# Patient Record
Sex: FEMALE | Race: WHITE | NOT HISPANIC OR LATINO
[De-identification: names, ages, dates, MRNs, and addresses within clinical notes are randomized per-mention and may not be internally consistent; named-entity substitution may affect disease eponyms.]

---

## 2017-06-09 ENCOUNTER — TRANSCRIPTION ENCOUNTER (OUTPATIENT)
Age: 52
End: 2017-06-09

## 2018-07-12 ENCOUNTER — OUTPATIENT (OUTPATIENT)
Dept: OUTPATIENT SERVICES | Facility: HOSPITAL | Age: 53
LOS: 1 days | End: 2018-07-12
Payer: COMMERCIAL

## 2018-07-12 VITALS
TEMPERATURE: 98 F | OXYGEN SATURATION: 98 % | DIASTOLIC BLOOD PRESSURE: 77 MMHG | SYSTOLIC BLOOD PRESSURE: 136 MMHG | WEIGHT: 126.99 LBS | HEIGHT: 61 IN | HEART RATE: 76 BPM | RESPIRATION RATE: 16 BRPM

## 2018-07-12 DIAGNOSIS — Z01.818 ENCOUNTER FOR OTHER PREPROCEDURAL EXAMINATION: ICD-10-CM

## 2018-07-12 DIAGNOSIS — N92.4 EXCESSIVE BLEEDING IN THE PREMENOPAUSAL PERIOD: ICD-10-CM

## 2018-07-12 DIAGNOSIS — Z98.51 TUBAL LIGATION STATUS: Chronic | ICD-10-CM

## 2018-07-12 DIAGNOSIS — Z98.890 OTHER SPECIFIED POSTPROCEDURAL STATES: Chronic | ICD-10-CM

## 2018-07-12 DIAGNOSIS — Z98.891 HISTORY OF UTERINE SCAR FROM PREVIOUS SURGERY: Chronic | ICD-10-CM

## 2018-07-12 DIAGNOSIS — I10 ESSENTIAL (PRIMARY) HYPERTENSION: ICD-10-CM

## 2018-07-12 LAB
ANION GAP SERPL CALC-SCNC: 7 MMOL/L — SIGNIFICANT CHANGE UP (ref 5–17)
BUN SERPL-MCNC: 11 MG/DL — SIGNIFICANT CHANGE UP (ref 7–23)
CALCIUM SERPL-MCNC: 8.7 MG/DL — SIGNIFICANT CHANGE UP (ref 8.5–10.1)
CHLORIDE SERPL-SCNC: 108 MMOL/L — SIGNIFICANT CHANGE UP (ref 96–108)
CO2 SERPL-SCNC: 27 MMOL/L — SIGNIFICANT CHANGE UP (ref 22–31)
CREAT SERPL-MCNC: 0.77 MG/DL — SIGNIFICANT CHANGE UP (ref 0.5–1.3)
GLUCOSE SERPL-MCNC: 83 MG/DL — SIGNIFICANT CHANGE UP (ref 70–99)
HCG UR QL: NEGATIVE — SIGNIFICANT CHANGE UP
HCT VFR BLD CALC: 36.2 % — SIGNIFICANT CHANGE UP (ref 34.5–45)
HGB BLD-MCNC: 12.5 G/DL — SIGNIFICANT CHANGE UP (ref 11.5–15.5)
MCHC RBC-ENTMCNC: 31.6 PG — SIGNIFICANT CHANGE UP (ref 27–34)
MCHC RBC-ENTMCNC: 34.5 GM/DL — SIGNIFICANT CHANGE UP (ref 32–36)
MCV RBC AUTO: 91.6 FL — SIGNIFICANT CHANGE UP (ref 80–100)
NRBC # BLD: 0 /100 WBCS — SIGNIFICANT CHANGE UP (ref 0–0)
PLATELET # BLD AUTO: 259 K/UL — SIGNIFICANT CHANGE UP (ref 150–400)
POTASSIUM SERPL-MCNC: 4 MMOL/L — SIGNIFICANT CHANGE UP (ref 3.5–5.3)
POTASSIUM SERPL-SCNC: 4 MMOL/L — SIGNIFICANT CHANGE UP (ref 3.5–5.3)
RBC # BLD: 3.95 M/UL — SIGNIFICANT CHANGE UP (ref 3.8–5.2)
RBC # FLD: 12.2 % — SIGNIFICANT CHANGE UP (ref 10.3–14.5)
SODIUM SERPL-SCNC: 142 MMOL/L — SIGNIFICANT CHANGE UP (ref 135–145)
WBC # BLD: 7.53 K/UL — SIGNIFICANT CHANGE UP (ref 3.8–10.5)
WBC # FLD AUTO: 7.53 K/UL — SIGNIFICANT CHANGE UP (ref 3.8–10.5)

## 2018-07-12 PROCEDURE — 93005 ELECTROCARDIOGRAM TRACING: CPT

## 2018-07-12 PROCEDURE — 80048 BASIC METABOLIC PNL TOTAL CA: CPT

## 2018-07-12 PROCEDURE — 86850 RBC ANTIBODY SCREEN: CPT

## 2018-07-12 PROCEDURE — 85027 COMPLETE CBC AUTOMATED: CPT

## 2018-07-12 PROCEDURE — 81025 URINE PREGNANCY TEST: CPT

## 2018-07-12 PROCEDURE — G0463: CPT

## 2018-07-12 PROCEDURE — 86900 BLOOD TYPING SEROLOGIC ABO: CPT

## 2018-07-12 PROCEDURE — 93010 ELECTROCARDIOGRAM REPORT: CPT | Mod: NC

## 2018-07-12 PROCEDURE — 86901 BLOOD TYPING SEROLOGIC RH(D): CPT

## 2018-07-12 NOTE — H&P PST ADULT - HISTORY OF PRESENT ILLNESS
53 yo female 51 yo female with h/o HTN, hypothyroidism, anxiety 53 yo female with h/o HTN, hypothyroidism, anxiety c/o irregular periods x 3 months. Pt had GYN evaluation- s/p pelvic sonogram - scheduled for dilatation and curettage hysteroscopy  on 07/19/2018.

## 2018-07-12 NOTE — H&P PST ADULT - PMH
Anxiety    Essential hypertension    Excessive bleeding in premenopausal period    Hypothyroidism, adult

## 2018-07-13 NOTE — H&P PST ADULT - HISTORY OF PRESENT ILLNESS
51 yo WF, , LMP-5/28/18, 6/14/18, c/o recent persistent heavy vaginal bleeding that started 6/14/18 and has persisted since. The bleeding has been very heavy for 2 wks and even "poured out " upon standing.

## 2018-07-18 ENCOUNTER — TRANSCRIPTION ENCOUNTER (OUTPATIENT)
Age: 53
End: 2018-07-18

## 2018-07-18 RX ORDER — ACETAMINOPHEN 500 MG
650 TABLET ORAL ONCE
Qty: 0 | Refills: 0 | Status: COMPLETED | OUTPATIENT
Start: 2018-07-19 | End: 2018-07-19

## 2018-07-18 RX ORDER — SODIUM CHLORIDE 9 MG/ML
1000 INJECTION, SOLUTION INTRAVENOUS
Qty: 0 | Refills: 0 | Status: DISCONTINUED | OUTPATIENT
Start: 2018-07-19 | End: 2018-07-19

## 2018-07-19 ENCOUNTER — OUTPATIENT (OUTPATIENT)
Dept: OUTPATIENT SERVICES | Facility: HOSPITAL | Age: 53
LOS: 1 days | End: 2018-07-19
Payer: COMMERCIAL

## 2018-07-19 VITALS
WEIGHT: 126.99 LBS | HEIGHT: 61 IN | SYSTOLIC BLOOD PRESSURE: 135 MMHG | DIASTOLIC BLOOD PRESSURE: 64 MMHG | TEMPERATURE: 98 F | HEART RATE: 69 BPM | OXYGEN SATURATION: 100 % | RESPIRATION RATE: 16 BRPM

## 2018-07-19 VITALS
SYSTOLIC BLOOD PRESSURE: 111 MMHG | RESPIRATION RATE: 16 BRPM | OXYGEN SATURATION: 95 % | TEMPERATURE: 98 F | DIASTOLIC BLOOD PRESSURE: 69 MMHG | HEART RATE: 75 BPM

## 2018-07-19 DIAGNOSIS — Z98.890 OTHER SPECIFIED POSTPROCEDURAL STATES: Chronic | ICD-10-CM

## 2018-07-19 DIAGNOSIS — Z01.818 ENCOUNTER FOR OTHER PREPROCEDURAL EXAMINATION: ICD-10-CM

## 2018-07-19 DIAGNOSIS — Z98.891 HISTORY OF UTERINE SCAR FROM PREVIOUS SURGERY: Chronic | ICD-10-CM

## 2018-07-19 DIAGNOSIS — Z98.51 TUBAL LIGATION STATUS: Chronic | ICD-10-CM

## 2018-07-19 DIAGNOSIS — N92.4 EXCESSIVE BLEEDING IN THE PREMENOPAUSAL PERIOD: ICD-10-CM

## 2018-07-19 PROCEDURE — 88305 TISSUE EXAM BY PATHOLOGIST: CPT

## 2018-07-19 PROCEDURE — 88305 TISSUE EXAM BY PATHOLOGIST: CPT | Mod: 26

## 2018-07-19 PROCEDURE — 58558 HYSTEROSCOPY BIOPSY: CPT

## 2018-07-19 RX ORDER — HYDROMORPHONE HYDROCHLORIDE 2 MG/ML
0.5 INJECTION INTRAMUSCULAR; INTRAVENOUS; SUBCUTANEOUS
Qty: 0 | Refills: 0 | Status: DISCONTINUED | OUTPATIENT
Start: 2018-07-19 | End: 2018-07-19

## 2018-07-19 RX ORDER — CITALOPRAM 10 MG/1
1 TABLET, FILM COATED ORAL
Qty: 0 | Refills: 0 | COMMUNITY

## 2018-07-19 RX ORDER — LOSARTAN POTASSIUM 100 MG/1
1 TABLET, FILM COATED ORAL
Qty: 0 | Refills: 0 | COMMUNITY

## 2018-07-19 RX ORDER — OXYCODONE HYDROCHLORIDE 5 MG/1
5 TABLET ORAL ONCE
Qty: 0 | Refills: 0 | Status: DISCONTINUED | OUTPATIENT
Start: 2018-07-19 | End: 2018-07-19

## 2018-07-19 RX ORDER — LEVOTHYROXINE SODIUM 125 MCG
1 TABLET ORAL
Qty: 0 | Refills: 0 | COMMUNITY

## 2018-07-19 RX ORDER — SODIUM CHLORIDE 9 MG/ML
1000 INJECTION, SOLUTION INTRAVENOUS
Qty: 0 | Refills: 0 | Status: DISCONTINUED | OUTPATIENT
Start: 2018-07-19 | End: 2018-07-19

## 2018-07-19 RX ORDER — METOCLOPRAMIDE HCL 10 MG
5 TABLET ORAL ONCE
Qty: 0 | Refills: 0 | Status: DISCONTINUED | OUTPATIENT
Start: 2018-07-19 | End: 2018-07-19

## 2018-07-19 RX ADMIN — Medication 650 MILLIGRAM(S): at 08:32

## 2018-07-19 RX ADMIN — SODIUM CHLORIDE 100 MILLILITER(S): 9 INJECTION, SOLUTION INTRAVENOUS at 09:44

## 2018-07-19 RX ADMIN — SODIUM CHLORIDE 60 MILLILITER(S): 9 INJECTION, SOLUTION INTRAVENOUS at 08:32

## 2018-07-19 NOTE — ASU DISCHARGE PLAN (ADULT/PEDIATRIC). - MEDICATION SUMMARY - MEDICATIONS TO TAKE
I will START or STAY ON the medications listed below when I get home from the hospital:    one a day vitamin  -- 1   once a day  -- Indication: For vit.    losartan 25 mg oral tablet  -- 1 tab(s) by mouth once a day  -- Indication: For HTN    citalopram 20 mg oral tablet  -- 1 tab(s) by mouth once a day  -- Indication: For depression    levothyroxine 125 mcg (0.125 mg) oral tablet  -- 1 tab(s) by mouth once a day  -- Indication: For hypothyroid

## 2018-07-19 NOTE — BRIEF OPERATIVE NOTE - PROCEDURE
<<-----Click on this checkbox to enter Procedure Hysteroscopy with dilation and curettage of uterus  07/19/2018    Active  LETICIA

## 2018-07-19 NOTE — ASU DISCHARGE PLAN (ADULT/PEDIATRIC). - NOTIFY
GYN Fever>100.4/Inability to Tolerate Liquids or Foods/Bleeding that does not stop/Pain not relieved by Medications

## 2018-07-20 LAB — SURGICAL PATHOLOGY FINAL REPORT - CH: SIGNIFICANT CHANGE UP

## 2020-02-26 NOTE — H&P PST ADULT - BLOOD TRANSFUSION, PREVIOUS, PROFILE
NURSE NOTES:

Received report from MILLICENT Lin. Patient is in bed, awake, alert, and responsive. Breathing 
regular and unlabored with no S/S of SOB noted at this time. Patient denies any pain or 
discomfort at this time. Patient is able to ambulate without assist to and from bathroom. IV 
access on the LFA, patent, intact, and running fluids at prescribed rate. Bed remains in 
lowest position, breaks engaged, and call light is within reach at all times. All other 
needs attended to, patient remains stable, will continue to monitor. no

## 2020-03-18 NOTE — H&P PST ADULT - GUM GEN PE MLT EXAM PC
Normal genitalia; no lesions; no discharge
I have personally seen and examined this patient.  I have fully participated in the care of this patient. I have reviewed all pertinent clinical information, including history, physical exam, plan and the Resident’s note and agree except as noted.

## 2021-05-17 ENCOUNTER — TRANSCRIPTION ENCOUNTER (OUTPATIENT)
Age: 56
End: 2021-05-17

## 2021-05-31 ENCOUNTER — TRANSCRIPTION ENCOUNTER (OUTPATIENT)
Age: 56
End: 2021-05-31

## 2021-08-16 ENCOUNTER — TRANSCRIPTION ENCOUNTER (OUTPATIENT)
Age: 56
End: 2021-08-16

## 2022-01-27 ENCOUNTER — EMERGENCY (EMERGENCY)
Facility: HOSPITAL | Age: 57
LOS: 1 days | Discharge: HOME | End: 2022-01-27
Attending: EMERGENCY MEDICINE | Admitting: EMERGENCY MEDICINE
Payer: COMMERCIAL

## 2022-01-27 VITALS
HEART RATE: 87 BPM | RESPIRATION RATE: 16 BRPM | DIASTOLIC BLOOD PRESSURE: 81 MMHG | SYSTOLIC BLOOD PRESSURE: 133 MMHG | TEMPERATURE: 99 F | OXYGEN SATURATION: 97 %

## 2022-01-27 VITALS
TEMPERATURE: 98 F | RESPIRATION RATE: 16 BRPM | SYSTOLIC BLOOD PRESSURE: 132 MMHG | HEART RATE: 85 BPM | OXYGEN SATURATION: 97 % | DIASTOLIC BLOOD PRESSURE: 83 MMHG | WEIGHT: 136.91 LBS | HEIGHT: 61 IN

## 2022-01-27 DIAGNOSIS — Z98.890 OTHER SPECIFIED POSTPROCEDURAL STATES: Chronic | ICD-10-CM

## 2022-01-27 DIAGNOSIS — Z98.891 HISTORY OF UTERINE SCAR FROM PREVIOUS SURGERY: Chronic | ICD-10-CM

## 2022-01-27 DIAGNOSIS — Z98.51 TUBAL LIGATION STATUS: Chronic | ICD-10-CM

## 2022-01-27 PROBLEM — E03.9 HYPOTHYROIDISM, UNSPECIFIED: Chronic | Status: ACTIVE | Noted: 2018-07-12

## 2022-01-27 PROBLEM — N92.4 EXCESSIVE BLEEDING IN THE PREMENOPAUSAL PERIOD: Chronic | Status: ACTIVE | Noted: 2018-07-12

## 2022-01-27 PROBLEM — F41.9 ANXIETY DISORDER, UNSPECIFIED: Chronic | Status: ACTIVE | Noted: 2018-07-12

## 2022-01-27 PROBLEM — I10 ESSENTIAL (PRIMARY) HYPERTENSION: Chronic | Status: ACTIVE | Noted: 2018-07-12

## 2022-01-27 LAB
ALBUMIN SERPL ELPH-MCNC: 3.7 G/DL — SIGNIFICANT CHANGE UP (ref 3.3–5)
ALP SERPL-CCNC: 99 U/L — SIGNIFICANT CHANGE UP (ref 40–120)
ALT FLD-CCNC: 36 U/L — SIGNIFICANT CHANGE UP (ref 12–78)
ANION GAP SERPL CALC-SCNC: 7 MMOL/L — SIGNIFICANT CHANGE UP (ref 5–17)
APPEARANCE UR: CLEAR — SIGNIFICANT CHANGE UP
APTT BLD: 32.8 SEC — SIGNIFICANT CHANGE UP (ref 27.5–35.5)
AST SERPL-CCNC: 22 U/L — SIGNIFICANT CHANGE UP (ref 15–37)
BASOPHILS # BLD AUTO: 0.06 K/UL — SIGNIFICANT CHANGE UP (ref 0–0.2)
BASOPHILS NFR BLD AUTO: 0.5 % — SIGNIFICANT CHANGE UP (ref 0–2)
BILIRUB SERPL-MCNC: 0.7 MG/DL — SIGNIFICANT CHANGE UP (ref 0.2–1.2)
BILIRUB UR-MCNC: NEGATIVE — SIGNIFICANT CHANGE UP
BUN SERPL-MCNC: 10 MG/DL — SIGNIFICANT CHANGE UP (ref 7–23)
CALCIUM SERPL-MCNC: 8.6 MG/DL — SIGNIFICANT CHANGE UP (ref 8.5–10.1)
CHLORIDE SERPL-SCNC: 112 MMOL/L — HIGH (ref 96–108)
CO2 SERPL-SCNC: 24 MMOL/L — SIGNIFICANT CHANGE UP (ref 22–31)
COLOR SPEC: YELLOW — SIGNIFICANT CHANGE UP
CREAT SERPL-MCNC: 0.77 MG/DL — SIGNIFICANT CHANGE UP (ref 0.5–1.3)
DIFF PNL FLD: NEGATIVE — SIGNIFICANT CHANGE UP
EOSINOPHIL # BLD AUTO: 0.2 K/UL — SIGNIFICANT CHANGE UP (ref 0–0.5)
EOSINOPHIL NFR BLD AUTO: 1.7 % — SIGNIFICANT CHANGE UP (ref 0–6)
FLUAV AG NPH QL: SIGNIFICANT CHANGE UP
FLUBV AG NPH QL: SIGNIFICANT CHANGE UP
GLUCOSE SERPL-MCNC: 118 MG/DL — HIGH (ref 70–99)
GLUCOSE UR QL: NEGATIVE — SIGNIFICANT CHANGE UP
HCT VFR BLD CALC: 40.4 % — SIGNIFICANT CHANGE UP (ref 34.5–45)
HGB BLD-MCNC: 13.7 G/DL — SIGNIFICANT CHANGE UP (ref 11.5–15.5)
IMM GRANULOCYTES NFR BLD AUTO: 0.3 % — SIGNIFICANT CHANGE UP (ref 0–1.5)
INR BLD: 1.2 RATIO — HIGH (ref 0.88–1.16)
KETONES UR-MCNC: NEGATIVE — SIGNIFICANT CHANGE UP
LACTATE SERPL-SCNC: 0.8 MMOL/L — SIGNIFICANT CHANGE UP (ref 0.7–2)
LEUKOCYTE ESTERASE UR-ACNC: NEGATIVE — SIGNIFICANT CHANGE UP
LYMPHOCYTES # BLD AUTO: 0.91 K/UL — LOW (ref 1–3.3)
LYMPHOCYTES # BLD AUTO: 7.9 % — LOW (ref 13–44)
MCHC RBC-ENTMCNC: 30.6 PG — SIGNIFICANT CHANGE UP (ref 27–34)
MCHC RBC-ENTMCNC: 33.9 GM/DL — SIGNIFICANT CHANGE UP (ref 32–36)
MCV RBC AUTO: 90.4 FL — SIGNIFICANT CHANGE UP (ref 80–100)
MONOCYTES # BLD AUTO: 0.74 K/UL — SIGNIFICANT CHANGE UP (ref 0–0.9)
MONOCYTES NFR BLD AUTO: 6.5 % — SIGNIFICANT CHANGE UP (ref 2–14)
NEUTROPHILS # BLD AUTO: 9.51 K/UL — HIGH (ref 1.8–7.4)
NEUTROPHILS NFR BLD AUTO: 83.1 % — HIGH (ref 43–77)
NITRITE UR-MCNC: NEGATIVE — SIGNIFICANT CHANGE UP
NRBC # BLD: 0 /100 WBCS — SIGNIFICANT CHANGE UP (ref 0–0)
PH UR: 6.5 — SIGNIFICANT CHANGE UP (ref 5–8)
PLATELET # BLD AUTO: 215 K/UL — SIGNIFICANT CHANGE UP (ref 150–400)
POTASSIUM SERPL-MCNC: 3.7 MMOL/L — SIGNIFICANT CHANGE UP (ref 3.5–5.3)
POTASSIUM SERPL-SCNC: 3.7 MMOL/L — SIGNIFICANT CHANGE UP (ref 3.5–5.3)
PROT SERPL-MCNC: 7.4 G/DL — SIGNIFICANT CHANGE UP (ref 6–8.3)
PROT UR-MCNC: 15
PROTHROM AB SERPL-ACNC: 13.9 SEC — HIGH (ref 10.6–13.6)
RBC # BLD: 4.47 M/UL — SIGNIFICANT CHANGE UP (ref 3.8–5.2)
RBC # FLD: 11.9 % — SIGNIFICANT CHANGE UP (ref 10.3–14.5)
RSV RNA NPH QL NAA+NON-PROBE: SIGNIFICANT CHANGE UP
SARS-COV-2 RNA SPEC QL NAA+PROBE: SIGNIFICANT CHANGE UP
SODIUM SERPL-SCNC: 143 MMOL/L — SIGNIFICANT CHANGE UP (ref 135–145)
SP GR SPEC: 1.01 — SIGNIFICANT CHANGE UP (ref 1.01–1.02)
UROBILINOGEN FLD QL: NEGATIVE — SIGNIFICANT CHANGE UP
WBC # BLD: 11.46 K/UL — HIGH (ref 3.8–10.5)
WBC # FLD AUTO: 11.46 K/UL — HIGH (ref 3.8–10.5)

## 2022-01-27 PROCEDURE — 83605 ASSAY OF LACTIC ACID: CPT

## 2022-01-27 PROCEDURE — 70491 CT SOFT TISSUE NECK W/DYE: CPT | Mod: MA

## 2022-01-27 PROCEDURE — 99285 EMERGENCY DEPT VISIT HI MDM: CPT | Mod: 25

## 2022-01-27 PROCEDURE — 36415 COLL VENOUS BLD VENIPUNCTURE: CPT

## 2022-01-27 PROCEDURE — 84145 PROCALCITONIN (PCT): CPT

## 2022-01-27 PROCEDURE — 96374 THER/PROPH/DIAG INJ IV PUSH: CPT | Mod: XU

## 2022-01-27 PROCEDURE — 93005 ELECTROCARDIOGRAM TRACING: CPT

## 2022-01-27 PROCEDURE — 70491 CT SOFT TISSUE NECK W/DYE: CPT | Mod: 26,MA

## 2022-01-27 PROCEDURE — 93010 ELECTROCARDIOGRAM REPORT: CPT

## 2022-01-27 PROCEDURE — 80053 COMPREHEN METABOLIC PANEL: CPT

## 2022-01-27 PROCEDURE — 84702 CHORIONIC GONADOTROPIN TEST: CPT

## 2022-01-27 PROCEDURE — 85025 COMPLETE CBC W/AUTO DIFF WBC: CPT

## 2022-01-27 PROCEDURE — 87637 SARSCOV2&INF A&B&RSV AMP PRB: CPT

## 2022-01-27 PROCEDURE — 87040 BLOOD CULTURE FOR BACTERIA: CPT

## 2022-01-27 PROCEDURE — 71045 X-RAY EXAM CHEST 1 VIEW: CPT

## 2022-01-27 PROCEDURE — 85730 THROMBOPLASTIN TIME PARTIAL: CPT

## 2022-01-27 PROCEDURE — 87086 URINE CULTURE/COLONY COUNT: CPT

## 2022-01-27 PROCEDURE — 71045 X-RAY EXAM CHEST 1 VIEW: CPT | Mod: 26

## 2022-01-27 PROCEDURE — 99285 EMERGENCY DEPT VISIT HI MDM: CPT

## 2022-01-27 PROCEDURE — 81001 URINALYSIS AUTO W/SCOPE: CPT

## 2022-01-27 PROCEDURE — 85610 PROTHROMBIN TIME: CPT

## 2022-01-27 RX ORDER — SODIUM CHLORIDE 9 MG/ML
1950 INJECTION INTRAMUSCULAR; INTRAVENOUS; SUBCUTANEOUS ONCE
Refills: 0 | Status: COMPLETED | OUTPATIENT
Start: 2022-01-27 | End: 2022-01-27

## 2022-01-27 RX ORDER — KETOROLAC TROMETHAMINE 30 MG/ML
15 SYRINGE (ML) INJECTION ONCE
Refills: 0 | Status: DISCONTINUED | OUTPATIENT
Start: 2022-01-27 | End: 2022-01-27

## 2022-01-27 RX ADMIN — SODIUM CHLORIDE 1950 MILLILITER(S): 9 INJECTION INTRAMUSCULAR; INTRAVENOUS; SUBCUTANEOUS at 11:00

## 2022-01-27 RX ADMIN — Medication 15 MILLIGRAM(S): at 12:05

## 2022-01-27 RX ADMIN — Medication 15 MILLIGRAM(S): at 11:51

## 2022-01-27 RX ADMIN — SODIUM CHLORIDE 1950 MILLILITER(S): 9 INJECTION INTRAMUSCULAR; INTRAVENOUS; SUBCUTANEOUS at 10:05

## 2022-01-27 NOTE — ED ADULT NURSE REASSESSMENT NOTE - NS ED NURSE REASSESS COMMENT FT1
1247- Pt resting comfortably in stretcher, breathing equal and unlabored. Skin warm, dry and good color. Pt reports improvement in pain. Safety measures maintained, Call bell within reach. Will continue to monitor.

## 2022-01-27 NOTE — ED PROVIDER NOTE - CLINICAL SUMMARY MEDICAL DECISION MAKING FREE TEXT BOX
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

## 2022-01-27 NOTE — ED PROVIDER NOTE - NSICDXPASTMEDICALHX_GEN_ALL_CORE_FT
PAST MEDICAL HISTORY:  Anxiety     Essential hypertension     Excessive bleeding in premenopausal period     Hypothyroidism, adult

## 2022-01-27 NOTE — ED PROVIDER NOTE - NSICDXPASTSURGICALHX_GEN_ALL_CORE_FT
PAST SURGICAL HISTORY:  History of bilateral tubal ligation 10/1997    History of  x 3    History of D&C 2007

## 2022-01-27 NOTE — ED ADULT NURSE NOTE - OBJECTIVE STATEMENT
Pt AOx4 breathing equal and unlabored has had neck pain since Tuesday. Pt unable to move her neck to the right. Pt denies injury or trauma. Pt states she began to have a fever last night. Pt denies fever today. pt denies nausea, vomiting, diarrhea. Safety measures initiated. Will continue to monitor.

## 2022-01-27 NOTE — ED PROVIDER NOTE - OBJECTIVE STATEMENT
pt reports R sided neck pain since Tuesday night that is worse with movement of the neck, no headache, pain is also worse with swallowing but no problems opening her mouth and talking.  Yesterday pt developed chills and fever of 100.6 at home.  tried tylenol and heat pack to neck without relief.

## 2022-01-27 NOTE — ED PROVIDER NOTE - NSFOLLOWUPINSTRUCTIONS_ED_ALL_ED_FT
-- Follow up with spine referral within the next few days.    -- Take motrin 400 mg every 6 hours with food for the next 5 days.    -- Return to the ER for worsening or persistent symptoms, and/or ANY NEW OR CONCERNING SYMPTOMS.    -- If you have difficulty following up, please call: [phone] to obtain a [facility] doctor or specialist who takes your insurance in your area. -- YOU HAVE CALCIFIC TENDONITIS OF THE RIGHT LONGUS COLI MUSCE OF THE NECK    -- Follow up with spine referral within the next few days.    -- Take motrin 400 mg every 6 hours with food for the next 5 days.    -- Return to the ER for worsening or persistent symptoms, and/or ANY NEW OR CONCERNING SYMPTOMS.    -- If you have difficulty following up, please call: [phone] to obtain a [facility] doctor or specialist who takes your insurance in your area.

## 2022-01-27 NOTE — ED PROVIDER NOTE - PATIENT PORTAL LINK FT
You can access the FollowMyHealth Patient Portal offered by [facility] by registering at the following website: [email or web address]. By joining [employer]’s FollowMyHealth portal, you will also be able to view your health information using other applications (apps) compatible with our system.

## 2022-01-27 NOTE — ED PROVIDER NOTE - CARE PROVIDER_API CALL
Bouchra Whitney (DO)  Orthopaedic Surgery Surgery  [address] [facility], [address] [place] [address]  Phone: [phone]  Fax: [phone]  Follow Up Time:

## 2022-01-28 ENCOUNTER — EMERGENCY (EMERGENCY)
Facility: HOSPITAL | Age: 57
LOS: 1 days | Discharge: HOME | End: 2022-01-28
Attending: EMERGENCY MEDICINE | Admitting: EMERGENCY MEDICINE
Payer: COMMERCIAL

## 2022-01-28 VITALS
HEART RATE: 92 BPM | OXYGEN SATURATION: 95 % | TEMPERATURE: 100 F | DIASTOLIC BLOOD PRESSURE: 77 MMHG | HEIGHT: 61 IN | WEIGHT: 136.03 LBS | SYSTOLIC BLOOD PRESSURE: 173 MMHG | RESPIRATION RATE: 18 BRPM

## 2022-01-28 VITALS
SYSTOLIC BLOOD PRESSURE: 139 MMHG | DIASTOLIC BLOOD PRESSURE: 76 MMHG | HEART RATE: 71 BPM | OXYGEN SATURATION: 96 % | TEMPERATURE: 99 F | RESPIRATION RATE: 18 BRPM

## 2022-01-28 DIAGNOSIS — Z98.51 TUBAL LIGATION STATUS: Chronic | ICD-10-CM

## 2022-01-28 DIAGNOSIS — Z98.890 OTHER SPECIFIED POSTPROCEDURAL STATES: Chronic | ICD-10-CM

## 2022-01-28 DIAGNOSIS — Z98.891 HISTORY OF UTERINE SCAR FROM PREVIOUS SURGERY: Chronic | ICD-10-CM

## 2022-01-28 LAB
ANION GAP SERPL CALC-SCNC: 5 MMOL/L — SIGNIFICANT CHANGE UP (ref 5–17)
APPEARANCE UR: CLEAR — SIGNIFICANT CHANGE UP
BACTERIA # UR AUTO: ABNORMAL
BASOPHILS # BLD AUTO: 0.04 K/UL — SIGNIFICANT CHANGE UP (ref 0–0.2)
BASOPHILS NFR BLD AUTO: 0.3 % — SIGNIFICANT CHANGE UP (ref 0–2)
BILIRUB UR-MCNC: NEGATIVE — SIGNIFICANT CHANGE UP
BUN SERPL-MCNC: 10 MG/DL — SIGNIFICANT CHANGE UP (ref 7–23)
CALCIUM SERPL-MCNC: 9.6 MG/DL — SIGNIFICANT CHANGE UP (ref 8.5–10.1)
CHLORIDE SERPL-SCNC: 111 MMOL/L — HIGH (ref 96–108)
CO2 SERPL-SCNC: 24 MMOL/L — SIGNIFICANT CHANGE UP (ref 22–31)
COLOR SPEC: YELLOW — SIGNIFICANT CHANGE UP
CREAT SERPL-MCNC: 0.75 MG/DL — SIGNIFICANT CHANGE UP (ref 0.5–1.3)
CULTURE RESULTS: SIGNIFICANT CHANGE UP
DIFF PNL FLD: ABNORMAL
EOSINOPHIL # BLD AUTO: 0.03 K/UL — SIGNIFICANT CHANGE UP (ref 0–0.5)
EOSINOPHIL NFR BLD AUTO: 0.2 % — SIGNIFICANT CHANGE UP (ref 0–6)
EPI CELLS # UR: SIGNIFICANT CHANGE UP
GLUCOSE SERPL-MCNC: 114 MG/DL — HIGH (ref 70–99)
GLUCOSE UR QL: NEGATIVE — SIGNIFICANT CHANGE UP
HCT VFR BLD CALC: 39.1 % — SIGNIFICANT CHANGE UP (ref 34.5–45)
HGB BLD-MCNC: 13.7 G/DL — SIGNIFICANT CHANGE UP (ref 11.5–15.5)
IMM GRANULOCYTES NFR BLD AUTO: 0.6 % — SIGNIFICANT CHANGE UP (ref 0–1.5)
KETONES UR-MCNC: ABNORMAL
LEUKOCYTE ESTERASE UR-ACNC: ABNORMAL
LYMPHOCYTES # BLD AUTO: 0.47 K/UL — LOW (ref 1–3.3)
LYMPHOCYTES # BLD AUTO: 3.7 % — LOW (ref 13–44)
MCHC RBC-ENTMCNC: 31.4 PG — SIGNIFICANT CHANGE UP (ref 27–34)
MCHC RBC-ENTMCNC: 35 GM/DL — SIGNIFICANT CHANGE UP (ref 32–36)
MCV RBC AUTO: 89.5 FL — SIGNIFICANT CHANGE UP (ref 80–100)
MONOCYTES # BLD AUTO: 0.3 K/UL — SIGNIFICANT CHANGE UP (ref 0–0.9)
MONOCYTES NFR BLD AUTO: 2.4 % — SIGNIFICANT CHANGE UP (ref 2–14)
NEUTROPHILS # BLD AUTO: 11.68 K/UL — HIGH (ref 1.8–7.4)
NEUTROPHILS NFR BLD AUTO: 92.8 % — HIGH (ref 43–77)
NITRITE UR-MCNC: NEGATIVE — SIGNIFICANT CHANGE UP
NRBC # BLD: 0 /100 WBCS — SIGNIFICANT CHANGE UP (ref 0–0)
PH UR: 6.5 — SIGNIFICANT CHANGE UP (ref 5–8)
PLATELET # BLD AUTO: 231 K/UL — SIGNIFICANT CHANGE UP (ref 150–400)
POTASSIUM SERPL-MCNC: 4 MMOL/L — SIGNIFICANT CHANGE UP (ref 3.5–5.3)
POTASSIUM SERPL-SCNC: 4 MMOL/L — SIGNIFICANT CHANGE UP (ref 3.5–5.3)
PROT UR-MCNC: 15
RAPID RVP RESULT: SIGNIFICANT CHANGE UP
RBC # BLD: 4.37 M/UL — SIGNIFICANT CHANGE UP (ref 3.8–5.2)
RBC # FLD: 11.6 % — SIGNIFICANT CHANGE UP (ref 10.3–14.5)
RBC CASTS # UR COMP ASSIST: SIGNIFICANT CHANGE UP /HPF (ref 0–4)
SARS-COV-2 RNA SPEC QL NAA+PROBE: SIGNIFICANT CHANGE UP
SODIUM SERPL-SCNC: 140 MMOL/L — SIGNIFICANT CHANGE UP (ref 135–145)
SP GR SPEC: 1.01 — SIGNIFICANT CHANGE UP (ref 1.01–1.02)
SPECIMEN SOURCE: SIGNIFICANT CHANGE UP
UROBILINOGEN FLD QL: NEGATIVE — SIGNIFICANT CHANGE UP
WBC # BLD: 12.59 K/UL — HIGH (ref 3.8–10.5)
WBC # FLD AUTO: 12.59 K/UL — HIGH (ref 3.8–10.5)
WBC UR QL: SIGNIFICANT CHANGE UP

## 2022-01-28 PROCEDURE — 85025 COMPLETE CBC W/AUTO DIFF WBC: CPT

## 2022-01-28 PROCEDURE — 99284 EMERGENCY DEPT VISIT MOD MDM: CPT

## 2022-01-28 PROCEDURE — 36415 COLL VENOUS BLD VENIPUNCTURE: CPT

## 2022-01-28 PROCEDURE — 0225U NFCT DS DNA&RNA 21 SARSCOV2: CPT

## 2022-01-28 PROCEDURE — 99283 EMERGENCY DEPT VISIT LOW MDM: CPT

## 2022-01-28 PROCEDURE — 80048 BASIC METABOLIC PNL TOTAL CA: CPT

## 2022-01-28 PROCEDURE — 81001 URINALYSIS AUTO W/SCOPE: CPT

## 2022-01-28 NOTE — ED ADULT NURSE NOTE - OBJECTIVE STATEMENT
Patient came in to ED c/o right sided neck pain x 2 days developed fever this afternoon @ 101.2F. Patient states was here yesterday for same and was discharge- followed up with Orthopedic this afternoon and was instructed to come to ED if she developed fever. Patient states she took meloxicam tonight with relief of neck pain.

## 2022-01-28 NOTE — ED PROVIDER NOTE - OBJECTIVE STATEMENT
55 yo F PMHx HTN, hypothyroidism anxiety presents to ED c/o R sided neck pain x 3 days. +fever/chills. Pt was seen here yesterday for same. Pt states that neck pain began 3 days ago, first noticed pain while driving, had pain as she was trying to turn her head to watch for oncoming traffic. Since the pt states that pain has gradually worsened. Decreased as a constant aching pain worse with movement of the neck. States her neck does not feel stiff but R trap region feels sore. Pt also c/o right sided throat pain, painful swallowing. Thorough workup yesterday revealed unremarkable labs, including neg blood cultures. neg flu/covid. CT neck soft tissue with IV contrast showed calcific tendonitis Pt states she followed up with ortho today- had XR which she reports showed "severe arthritis" and was prescribed Mobic, a muscle relaxant (which pt states she has yet to take) and steroids (which pt states she took just prior to arrival). Pt reports that the orthopedist felt her symptoms were MSK in nature but instructed her to return to ED for fever. Pt admits that medication she was prescribed did help with the pain. Tonight, pt states she woke up with a fever 101.3 F and although she felt better, decided to return since ortho instructed her to do so. Pt states she overall feels well. Pt states that when she saw that she was afebrile in triage, she wanted to go home and not proceed with another ED evaluation. Denies HA, dizziness, body aches, chest pain, SOB, cough, URI symptoms, abd pain, N/V/D, urinary symptoms. 55 yo F PMHx HTN, hypothyroidism anxiety presents to ED c/o R sided neck pain x 3 days. +intermittent fever/chills. Pt was seen here yesterday for same. Pt states that neck pain began 3 days ago, first noticed pain while driving, had pain as she was trying to turn her head to watch for oncoming traffic. Since the pt states that pain has gradually worsened. Decreased as a constant aching pain worse with movement of the neck. States her neck does not feel stiff but R trap region feels sore. Pt also c/o right sided throat pain, painful swallowing. Thorough workup yesterday revealed unremarkable labs, including neg blood cultures. neg flu/covid. CT neck soft tissue with IV contrast showed calcific tendonitis Pt states she followed up with ortho today- had XR which she reports showed "severe arthritis" and was prescribed Mobic, a muscle relaxant (which pt states she has yet to take) and steroids (which pt states she took just prior to arrival). Pt reports that the orthopedist felt her symptoms were MSK in nature but instructed her to return to ED for fever. Pt admits that medication she was prescribed did help with the pain. Tonight, pt states she woke up with a fever 101.3 F and although she felt better, decided to return since ortho instructed her to do so. Pt states she overall feels well. Pt states that when she saw that she was afebrile in triage, she wanted to go home and not proceed with another ED evaluation. Denies HA, dizziness, body aches, chest pain, SOB, cough, URI symptoms, abd pain, N/V/D, urinary symptoms.

## 2022-01-28 NOTE — ED PROVIDER NOTE - PATIENT PORTAL LINK FT
You can access the FollowMyHealth Patient Portal offered by [facility] by registering at the following website: [email or web address]. By joining [employer]’s FollowMyHealth portal, you will also be able to view your health information using other applications (apps) compatible with our system.

## 2022-01-28 NOTE — ED PROVIDER NOTE - MUSCULOSKELETAL NECK EXAM
left sternocleidomastoid tender palpable spasms noted; FROM, no nuchal rigidity/supple left sternocleidomastoid tender palpable spasms noted; FROM, no nuchal rigidity, no redness, no warmth, no crepitus/supple

## 2022-01-28 NOTE — ED ADULT NURSE NOTE - NSIMPLEMENTINTERV_GEN_ALL_ED
Implemented All Universal Safety Interventions:  [place] to call system. Call bell, personal items and telephone within reach. Instruct patient to call for assistance. Room bathroom lighting operational. Non-slip footwear when patient is off stretcher. Physically safe environment: no spills, clutter or unnecessary equipment. Stretcher in lowest position, wheels locked, appropriate side rails in place.

## 2022-01-28 NOTE — ED PROVIDER NOTE - CPE EDP GASTRO NORM
[FreeTextEntry1] : 66 years old woman presenting for evaluation of polyuria\par \par Polyuria -- The patient reports 2 to 3 L intake of water per day and also consumed red meat twice per week.  It is possible that solute diuresis with increased water intake is driving this.  Alternatively I wonder if she had ATN during her COVID infection and now is having a post ATN diuresis (she does have slight edema on exam today).  She does not usually feel thirsty and so I do not think that DI is playing a major role here but cannot be sure.  I asked her to drink only to thirst for now.  I asked her to recheck her 24 urine volume after she drinks only to thirst.  Lastly, I asked her to come in for fasting (including water) next week to see if she is appropriately concentrating her urine.  I explained to her that if she feels thirsty or lightheaded during the fast then she should drink something and stop fasting.
normal...

## 2022-01-28 NOTE — ED PROVIDER NOTE - NSFOLLOWUPINSTRUCTIONS_ED_ALL_ED_FT
Continue taking medication as prescribed by orthopedist.  Stay well hydrated.  Return to ED immediately for new or worsening symptoms as we discussed.       CALCIFIC TENDINITIS - General Information           Calcific Tendinitis    WHAT YOU NEED TO KNOW:    What is calcific tendinitis? Calcific tendinitis is a condition that occurs when calcium collects in the tendons of the shoulder. Tendons are bands of tissue that connect muscle to bone. The calcium can make the tendons swell and cause severe pain.    What increases my risk for calcific tendinitis? There is no known cause of calcific tendinitis. The following may increase your risk:   •Family history of calcific tendinitis      •Age between 30 and 50 years      •Female gender      •Lack of physical activity      •Medical conditions, such as diabetes      What are the signs and symptoms of calcific tendinitis? Signs and symptoms may be sudden and severe, lasting several weeks. Symptoms can also be mild and last several months.   •Pain in your shoulder that may spread to your neck      •Trouble sleeping because of pain       •Stiffness or weakness in your arm or shoulder      •Decreased arm movement      How is calcific tendinitis diagnosed? Your healthcare provider will check how well you can move your shoulder and arm. He may check the function of your elbow, wrist, and hand. He may compare the movement and strength of your painful shoulder against your healthy shoulder. You may also need the following tests:   •An x-ray may show calcium buildup in your shoulder.      •An ultrasound uses sound waves to show pictures on a monitor. An ultrasound may be done to show the cause of your pain.      •An MRI takes pictures of your shoulder. An MRI may show calcium in your shoulder or another cause of your pain. You may be given dye to help the parts of your shoulder show up better in the pictures. Tell the healthcare provider if you have ever had an allergic reaction to contrast dye. Do not enter the MRI room with anything metal. Metal can cause serious injury. Tell the healthcare provider if you have any metal in or on your body.      How is calcific tendinitis treated? Calcific tendinitis may go away on its own. The body absorbs the calcium, and the tendon heals. You may need any of the following:   •Medicines: ?NSAIDs may decrease swelling and pain. This medicine can be bought with or without a doctor's order. This medicine can cause stomach bleeding or kidney problems in certain people. If you take blood thinner medicine, always ask your healthcare provider if NSAIDs are safe for you. Always read the medicine label and follow the directions on it before using this medicine.      ?Steroids help decrease inflammation. You may be given steroids as a pill or a shot in your shoulder.      •Needling is a procedure used to break up the calcium or remove it. Your healthcare provider inserts one or more needles through your skin and into your shoulder.       •Extracorporeal shockwave therapy (ESWT) uses sound waves aimed at the calcium to help break it up. The pieces of calcium are then absorbed back into the body. ESWT may be done if symptoms last 3 months or longer.      •Surgery may be needed to remove the calcium if you have severe pain for several months and other treatments have not helped. Damaged tissue or bone may also be removed.      How can I manage my symptoms?   •Go to physical therapy. A physical therapist can teach you exercises to help improve movement and strength, and to decrease pain.      •Apply ice on your shoulder for 15 to 20 minutes every hour or as directed. Use an ice pack, or put crushed ice in a plastic bag. Cover it with a towel. Ice helps prevent tissue damage and decreases swelling and pain.      •Apply heat on your shoulder for 20 to 30 minutes every 2 hours for as many days as directed. Heat helps decrease pain and muscle spasms.      •Rest your arm. Healthcare providers may have you place an item, such as a ball, between your side and elbow while you rest. This may help decrease stiffness and pain.      When should I contact my healthcare provider?   •You have worse pain and stiffness in your shoulder.      •You have new or more trouble moving your arm.      •You have questions or concerns about your condition or care.      When should I seek immediate care or call 911?   •You cannot move your arm.      •You have severe pain in your arm or shoulder.      CARE AGREEMENT:    You have the right to help plan your care. Learn about your health condition and how it may be treated. Discuss treatment options with your healthcare providers to decide what care you want to receive. You always have the right to refuse treatment.        © Copyright [employer] 2022           back to top                          © Copyright [employer] 2022

## 2022-01-28 NOTE — ED PROVIDER NOTE - PROGRESS NOTE DETAILS
Labs unremarkable. Pt comfortable, feeling well, ambulating in ED with no distress. No evidence of any meningeal irritation on exam. CT yesterday no abscess. Pt admits that she is no worse than yesterday, does states she is feeling better after seeing ortho today. Discussed empiric abx treatment with attending. Decision made to defer as there is no evidence of acute bacterial infxn at this time. Had lengthy discussion with patient regarding DDx that have been considered, including meningitis/abscess. Pt states she feels well, does not want any further workup at this time. Discussed the results of all diagnostic testing in ED and copies of all reports given.   Pt was given an opportunity to have all questions answered to satisfaction.  Discussed the importance of prompt, close medical follow-up. Very strict ED return precautions discussed at length.  Pt verbalizes agreement and understanding of plan and ED return precautions. Pt well appearing, stable for DC home. No emergent concerns at this time. Labs unremarkable. Pt comfortable, feeling well, ambulating in ED with no distress. No evidence of any meningeal irritation on exam. CT yesterday no abscess. Pt admits that she is no worse than yesterday, in fact states she is feeling better after seeing ortho today. (Discussed empiric abx treatment with attending. Decision made to defer as there is no evidence of acute bacterial infxn at this time.) Had lengthy discussion with patient regarding DDx that have been considered, including meningitis/abscess. Pt states she feels well, does not want any further workup at this time, is requesting dc. Discussed the results of all diagnostic testing in ED and copies of all reports given.   Pt was given an opportunity to have all questions answered to satisfaction.  Discussed the importance of prompt, close medical follow-up. Very strict ED return precautions discussed at length.  Pt verbalizes agreement and understanding of plan and ED return precautions. Pt well appearing, stable for DC home. No emergent concerns at this time.

## 2022-01-28 NOTE — ED PROVIDER NOTE - CLINICAL SUMMARY MEDICAL DECISION MAKING FREE TEXT BOX
57 yo F with R sided neck pain and intermittent fever, here yesterday for same, workup significant for calcific tendonitis, otherwise unremarkable returns today for repeat eval, fever 101.3 F at home prior to arrival---> pt well appearing, afebrile here, exam remarkable for right SCM tenderness, neck supple, no nuchal rigidity, pt nontoxic appearing no HA no AMS --> plan to rpt labs

## 2022-01-28 NOTE — ED ADULT TRIAGE NOTE - CHIEF COMPLAINT QUOTE
neck pain on right side. pt saw an orthopedic today and he told her to come to the ED if she had a fever. temp was 101.3 pt did not take antipyretic.

## 2022-01-28 NOTE — ED ADULT NURSE NOTE - DOES PATIENT HAVE ADVANCE DIRECTIVE
Sore Throat  A sore throat is pain, burning, irritation, or scratchiness in the throat. When you have a sore throat, you may feel pain or tenderness in your throat when you swallow or talk.  Many things can cause a sore throat, including:  · An infection.  · Seasonal allergies.  · Dryness in the air.  · Irritants, such as smoke or pollution.  · Gastroesophageal reflux disease (GERD).  · A tumor.  A sore throat is often the first sign of another sickness. It may happen with other symptoms, such as coughing, sneezing, fever, and swollen neck glands. Most sore throats go away without medical treatment.  Follow these instructions at home:  · Take over-the-counter medicines only as told by your health care provider.  · Drink enough fluids to keep your urine clear or pale yellow.  · Rest as needed.  · To help with pain, try:  ¨ Sipping warm liquids, such as broth, herbal tea, or warm water.  ¨ Eating or drinking cold or frozen liquids, such as frozen ice pops.  ¨ Gargling with a salt-water mixture 3-4 times a day or as needed. To make a salt-water mixture, completely dissolve ½-1 tsp of salt in 1 cup of warm water.  ¨ Sucking on hard candy or throat lozenges.  ¨ Putting a cool-mist humidifier in your bedroom at night to moisten the air.  ¨ Sitting in the bathroom with the door closed for 5-10 minutes while you run hot water in the shower.  · Do not use any tobacco products, such as cigarettes, chewing tobacco, and e-cigarettes. If you need help quitting, ask your health care provider.  Contact a health care provider if:  · You have a fever for more than 2-3 days.  · You have symptoms that last (are persistent) for more than 2-3 days.  · Your throat does not get better within 7 days.  · You have a fever and your symptoms suddenly get worse.  Get help right away if:  · You have difficulty breathing.  · You cannot swallow fluids, soft foods, or your saliva.  · You have increased swelling in your throat or neck.  · You have  persistent nausea and vomiting.  This information is not intended to replace advice given to you by your health care provider. Make sure you discuss any questions you have with your health care provider.  Document Released: 01/25/2006 Document Revised: 08/13/2017 Document Reviewed: 10/07/2016  Elsevier Interactive Patient Education © 2017 [employer]     No

## 2022-01-28 NOTE — ED PROVIDER NOTE - ATTENDING CONTRIBUTION TO CARE
55 y/o F with c/o fever and right sided neck pain  few days.  pt was seen in the ED for similar symptoms yesterday and had nml labs, CT neck indicated calcific tendonitis on the right.  pt was sent to fu with Ortho. Ortho states if fevers continue to come to the ED    right sided ttp to right cervical muscle  FROM   otherwise exam unremarkable    labs, pain control, abx?

## 2022-01-29 NOTE — ED POST DISCHARGE NOTE - DETAILS
Spoke with patient as a courtesy follow up. Pt states: "Oh my goodness. I am doing SO much better." Pt admits to taking muscle relaxant as was recommended yesterday and reports it made a signficicant difference. States her neck feels much better, her throat does not hurt and she is now able to swallow with ease. Pt denies fever as well. States she is doing very well, will continue with outpt follow up and expresses extreme gratitude for the call. Pt with no further questions or concerns. Called patient as a courtesy follow up. Pt states: "Oh my goodness. I am doing SO much better today." Pt admits to taking muscle relaxant as was recommended yesterday and reports it made a significant difference. States her neck feels much better, her throat does not hurt and she is now able to swallow with ease. Pt denies fever as well. States she is doing very well, will continue with outpt follow up and expresses extreme gratitude for the call. Pt with no further questions or concerns.

## 2022-02-01 LAB
CULTURE RESULTS: SIGNIFICANT CHANGE UP
CULTURE RESULTS: SIGNIFICANT CHANGE UP
SPECIMEN SOURCE: SIGNIFICANT CHANGE UP
SPECIMEN SOURCE: SIGNIFICANT CHANGE UP

## 2022-05-23 NOTE — ASU PATIENT PROFILE, ADULT - PATIENT KNOW
[FreeTextEntry1] : \par \par S/P Left Hip replacement 2/20.\par \par CAD: on ASA and atorvastatin.\par - F/up w/ cardiology, Dr Funez.\par \par HTN: Better control. \par -Continue amlodipine 10mg, Ramipirl 10mg and Atenolol.. Home BP monitoring. \par \par Blood drawn today.\par Further recommendations with lab results.\par \par  no yes

## 2022-09-13 NOTE — ASU PATIENT PROFILE, ADULT - BRADEN SCORE (IF 18 OR LESS ACTIVATE SKIN INJURY RISK INCREASED GUIDELINE), MLM
States that she started bleeding end of August, some spotting in between, but no heavy bleeding.  She didn't take her pill today because she thought that would be better..  Explained that sometimes it takes the body 6-9 months to regulate, also to take pill at same time everyday, even suggested to set alarm (states that's what she is going to do).   ED precautions reviewed with her .   Verbalizes understanding  
23

## 2022-12-24 NOTE — ASU DISCHARGE PLAN (ADULT/PEDIATRIC). - MEDICATION SUMMARY - MEDICATIONS TO STOP TAKING
I will STOP taking the medications listed below when I get home from the hospital:  None
flu-like symptoms

## 2023-01-23 ENCOUNTER — TRANSCRIPTION ENCOUNTER (OUTPATIENT)
Age: 58
End: 2023-01-23

## 2023-03-02 NOTE — ED PROVIDER NOTE - SKIN, MLM
no anemia, no easy bruising, no jaundice, no swollen lymph nodes.
Skin normal color for race, warm, dry and intact. No evidence of rash.

## 2023-08-25 ENCOUNTER — OFFICE (OUTPATIENT)
Dept: URBAN - METROPOLITAN AREA CLINIC 109 | Facility: CLINIC | Age: 58
Setting detail: OPHTHALMOLOGY
End: 2023-08-25
Payer: COMMERCIAL

## 2023-08-25 DIAGNOSIS — H02.832: ICD-10-CM

## 2023-08-25 DIAGNOSIS — H52.4: ICD-10-CM

## 2023-08-25 DIAGNOSIS — H40.033: ICD-10-CM

## 2023-08-25 DIAGNOSIS — H02.831: ICD-10-CM

## 2023-08-25 DIAGNOSIS — H02.834: ICD-10-CM

## 2023-08-25 DIAGNOSIS — H02.835: ICD-10-CM

## 2023-08-25 PROCEDURE — 92020 GONIOSCOPY: CPT | Performed by: OPHTHALMOLOGY

## 2023-08-25 PROCEDURE — 92004 COMPRE OPH EXAM NEW PT 1/>: CPT | Performed by: OPHTHALMOLOGY

## 2023-08-25 ASSESSMENT — CONFRONTATIONAL VISUAL FIELD TEST (CVF)
OS_FINDINGS: FULL
OD_FINDINGS: FULL

## 2023-08-25 ASSESSMENT — VISUAL ACUITY
OD_BCVA: 20/20
OS_BCVA: 20/20-2

## 2023-08-25 ASSESSMENT — REFRACTION_AUTOREFRACTION
OS_CYLINDER: -0.50
OD_CYLINDER: -0.25
OD_AXIS: 31
OS_AXIS: 130
OS_SPHERE: +0.25
OD_SPHERE: 0.00

## 2023-08-25 ASSESSMENT — REFRACTION_MANIFEST
OD_SPHERE: PLANO
OS_VA1: 20/20
OD_VA1: 20/20
OS_SPHERE: PLANO

## 2023-08-25 ASSESSMENT — TONOMETRY
OD_IOP_MMHG: 18
OS_IOP_MMHG: 18

## 2023-08-25 ASSESSMENT — SPHEQUIV_DERIVED
OS_SPHEQUIV: 0
OD_SPHEQUIV: -0.125

## 2023-08-25 ASSESSMENT — LID POSITION - DERMATOCHALASIS
OS_DERMATOCHALASIS: LLL LUL 1+
OD_DERMATOCHALASIS: RLL RUL 1+

## 2023-10-30 NOTE — ED ADULT NURSE NOTE - NS ED NOTE ABUSE RESPONSE YN
Pt called ems from home for epigastric pain since 630pm Sunday - dinner at 6p  Denies vomiting  interm nausea
Yes

## 2023-11-11 PROBLEM — Z00.00 ENCOUNTER FOR PREVENTIVE HEALTH EXAMINATION: Status: ACTIVE | Noted: 2023-11-11

## 2023-11-30 ENCOUNTER — APPOINTMENT (OUTPATIENT)
Dept: CT IMAGING | Facility: CLINIC | Age: 58
End: 2023-11-30